# Patient Record
Sex: MALE
[De-identification: names, ages, dates, MRNs, and addresses within clinical notes are randomized per-mention and may not be internally consistent; named-entity substitution may affect disease eponyms.]

---

## 2022-06-11 ENCOUNTER — NURSE TRIAGE (OUTPATIENT)
Dept: OTHER | Facility: CLINIC | Age: 32
End: 2022-06-11

## 2022-06-11 NOTE — TELEPHONE ENCOUNTER
Subjective: Caller states \"double ear infection, I need some antibiotics. \"     Current Symptoms: ear pain and congestion, had head congestion and cough but that is gone,  The cold sx started about 5-6 days ago. He does not have a PCP. Assisted patient in finding convenience clinics in network near him, discussed the website for [facility] provider search. He was hoping for a video visit. Discussed [employer]    Onset: 1 day ago; sudden, worsening    Pain Severity: 4/10; sharp, pressure; constant. Waxes and wanes     Temperature: feverish by parent's tactile estimate    What has been tried: Ibuprofen     Recommended disposition: See PCP within 24 Hours    Care advice provided, patient verbalizes understanding; denies any other questions or concerns; instructed to call back for any new or worsening symptoms. Patient/caller agrees to follow-up with PCP     This triage is a result of a call to [address] [place] [address]. Please do not respond to the triage nurse through this encounter. Any subsequent communication should be directly with the patient.       Reason for Disposition   Earache  (Exceptions: brief ear pain of < 60 minutes duration, earache occurring during air travel    Protocols used: EARACHE-ADULT-